# Patient Record
Sex: MALE | Race: BLACK OR AFRICAN AMERICAN | NOT HISPANIC OR LATINO | Employment: FULL TIME | URBAN - METROPOLITAN AREA
[De-identification: names, ages, dates, MRNs, and addresses within clinical notes are randomized per-mention and may not be internally consistent; named-entity substitution may affect disease eponyms.]

---

## 2021-03-07 ENCOUNTER — APPOINTMENT (OUTPATIENT)
Dept: ULTRASOUND IMAGING | Facility: CLINIC | Age: 21
End: 2021-03-07
Attending: EMERGENCY MEDICINE
Payer: OTHER GOVERNMENT

## 2021-03-07 ENCOUNTER — HOSPITAL ENCOUNTER (EMERGENCY)
Facility: CLINIC | Age: 21
Discharge: HOME | End: 2021-03-07
Attending: EMERGENCY MEDICINE | Admitting: EMERGENCY MEDICINE
Payer: OTHER GOVERNMENT

## 2021-03-07 VITALS
BODY MASS INDEX: 20.46 KG/M2 | OXYGEN SATURATION: 99 % | HEART RATE: 77 BPM | SYSTOLIC BLOOD PRESSURE: 128 MMHG | RESPIRATION RATE: 16 BRPM | HEIGHT: 68 IN | DIASTOLIC BLOOD PRESSURE: 88 MMHG | WEIGHT: 135 LBS | TEMPERATURE: 99.7 F

## 2021-03-07 DIAGNOSIS — N45.1 EPIDIDYMITIS: ICD-10-CM

## 2021-03-07 DIAGNOSIS — U07.1 2019 NOVEL CORONAVIRUS DISEASE (COVID-19): ICD-10-CM

## 2021-03-07 DIAGNOSIS — R31.9 URINARY TRACT INFECTION WITH HEMATURIA, SITE UNSPECIFIED: ICD-10-CM

## 2021-03-07 DIAGNOSIS — N50.89 TESTICULAR MICROLITHIASIS: ICD-10-CM

## 2021-03-07 DIAGNOSIS — N39.0 URINARY TRACT INFECTION WITH HEMATURIA, SITE UNSPECIFIED: ICD-10-CM

## 2021-03-07 LAB
ALBUMIN UR-MCNC: 10 MG/DL
ANION GAP SERPL CALCULATED.3IONS-SCNC: 6 MMOL/L (ref 3–14)
APPEARANCE UR: ABNORMAL
BASOPHILS # BLD AUTO: 0 10E9/L (ref 0–0.2)
BASOPHILS NFR BLD AUTO: 0 %
BILIRUB UR QL STRIP: NEGATIVE
BUN SERPL-MCNC: 7 MG/DL (ref 7–30)
CALCIUM SERPL-MCNC: 9.1 MG/DL (ref 8.5–10.1)
CHLORIDE SERPL-SCNC: 102 MMOL/L (ref 94–109)
CO2 SERPL-SCNC: 27 MMOL/L (ref 20–32)
COLOR UR AUTO: ABNORMAL
CREAT SERPL-MCNC: 0.88 MG/DL (ref 0.66–1.25)
DIFFERENTIAL METHOD BLD: ABNORMAL
EOSINOPHIL # BLD AUTO: 0 10E9/L (ref 0–0.7)
EOSINOPHIL NFR BLD AUTO: 0 %
ERYTHROCYTE [DISTWIDTH] IN BLOOD BY AUTOMATED COUNT: 11 % (ref 10–15)
FLUAV RNA RESP QL NAA+PROBE: NEGATIVE
FLUBV RNA RESP QL NAA+PROBE: NEGATIVE
GFR SERPL CREATININE-BSD FRML MDRD: >90 ML/MIN/{1.73_M2}
GLUCOSE SERPL-MCNC: 112 MG/DL (ref 70–99)
GLUCOSE UR STRIP-MCNC: NEGATIVE MG/DL
HCT VFR BLD AUTO: 42.7 % (ref 40–53)
HGB BLD-MCNC: 14.2 G/DL (ref 13.3–17.7)
HGB UR QL STRIP: ABNORMAL
KETONES UR STRIP-MCNC: NEGATIVE MG/DL
LABORATORY COMMENT REPORT: ABNORMAL
LACTATE BLD-SCNC: 1.1 MMOL/L (ref 0.7–2)
LEUKOCYTE ESTERASE UR QL STRIP: ABNORMAL
LYMPHOCYTES # BLD AUTO: 3.1 10E9/L (ref 0.8–5.3)
LYMPHOCYTES NFR BLD AUTO: 13 %
MCH RBC QN AUTO: 31.2 PG (ref 26.5–33)
MCHC RBC AUTO-ENTMCNC: 33.3 G/DL (ref 31.5–36.5)
MCV RBC AUTO: 94 FL (ref 78–100)
MONOCYTES # BLD AUTO: 1.9 10E9/L (ref 0–1.3)
MONOCYTES NFR BLD AUTO: 8 %
MUCOUS THREADS #/AREA URNS LPF: PRESENT /LPF
NEUTROPHILS # BLD AUTO: 18.9 10E9/L (ref 1.6–8.3)
NEUTROPHILS NFR BLD AUTO: 79 %
NITRATE UR QL: NEGATIVE
PH UR STRIP: 7.5 PH (ref 5–7)
PLATELET # BLD AUTO: 183 10E9/L (ref 150–450)
PLATELET # BLD EST: ABNORMAL 10*3/UL
POTASSIUM SERPL-SCNC: 3.2 MMOL/L (ref 3.4–5.3)
RBC # BLD AUTO: 4.55 10E12/L (ref 4.4–5.9)
RBC #/AREA URNS AUTO: 29 /HPF (ref 0–2)
RBC MORPH BLD: ABNORMAL
RSV RNA SPEC QL NAA+PROBE: ABNORMAL
SARS-COV-2 RNA RESP QL NAA+PROBE: POSITIVE
SODIUM SERPL-SCNC: 135 MMOL/L (ref 133–144)
SOURCE: ABNORMAL
SP GR UR STRIP: 1.02 (ref 1–1.03)
SPECIMEN SOURCE: ABNORMAL
SQUAMOUS #/AREA URNS AUTO: <1 /HPF (ref 0–1)
UROBILINOGEN UR STRIP-MCNC: NORMAL MG/DL (ref 0–2)
WBC # BLD AUTO: 23.9 10E9/L (ref 4–11)
WBC #/AREA URNS AUTO: >182 /HPF (ref 0–5)

## 2021-03-07 PROCEDURE — 96374 THER/PROPH/DIAG INJ IV PUSH: CPT

## 2021-03-07 PROCEDURE — 80048 BASIC METABOLIC PNL TOTAL CA: CPT | Performed by: EMERGENCY MEDICINE

## 2021-03-07 PROCEDURE — 83605 ASSAY OF LACTIC ACID: CPT | Performed by: EMERGENCY MEDICINE

## 2021-03-07 PROCEDURE — 96372 THER/PROPH/DIAG INJ SC/IM: CPT | Performed by: EMERGENCY MEDICINE

## 2021-03-07 PROCEDURE — 76870 US EXAM SCROTUM: CPT

## 2021-03-07 PROCEDURE — 85025 COMPLETE CBC W/AUTO DIFF WBC: CPT | Performed by: EMERGENCY MEDICINE

## 2021-03-07 PROCEDURE — 87491 CHLMYD TRACH DNA AMP PROBE: CPT | Performed by: EMERGENCY MEDICINE

## 2021-03-07 PROCEDURE — 87086 URINE CULTURE/COLONY COUNT: CPT | Performed by: EMERGENCY MEDICINE

## 2021-03-07 PROCEDURE — 87591 N.GONORRHOEAE DNA AMP PROB: CPT | Performed by: EMERGENCY MEDICINE

## 2021-03-07 PROCEDURE — 99284 EMERGENCY DEPT VISIT MOD MDM: CPT | Mod: 25

## 2021-03-07 PROCEDURE — 250N000013 HC RX MED GY IP 250 OP 250 PS 637: Performed by: EMERGENCY MEDICINE

## 2021-03-07 PROCEDURE — C9803 HOPD COVID-19 SPEC COLLECT: HCPCS

## 2021-03-07 PROCEDURE — 250N000011 HC RX IP 250 OP 636: Performed by: EMERGENCY MEDICINE

## 2021-03-07 PROCEDURE — 81001 URINALYSIS AUTO W/SCOPE: CPT | Performed by: EMERGENCY MEDICINE

## 2021-03-07 PROCEDURE — 250N000009 HC RX 250: Performed by: EMERGENCY MEDICINE

## 2021-03-07 PROCEDURE — 87636 SARSCOV2 & INF A&B AMP PRB: CPT | Performed by: EMERGENCY MEDICINE

## 2021-03-07 PROCEDURE — 87040 BLOOD CULTURE FOR BACTERIA: CPT | Performed by: EMERGENCY MEDICINE

## 2021-03-07 RX ORDER — POTASSIUM CHLORIDE 1500 MG/1
40 TABLET, EXTENDED RELEASE ORAL ONCE
Status: COMPLETED | OUTPATIENT
Start: 2021-03-07 | End: 2021-03-07

## 2021-03-07 RX ORDER — ACETAMINOPHEN 325 MG/1
650 TABLET ORAL ONCE
Status: COMPLETED | OUTPATIENT
Start: 2021-03-07 | End: 2021-03-07

## 2021-03-07 RX ORDER — IBUPROFEN 600 MG/1
600 TABLET, FILM COATED ORAL EVERY 6 HOURS PRN
Qty: 20 TABLET | Refills: 0 | Status: SHIPPED | OUTPATIENT
Start: 2021-03-07

## 2021-03-07 RX ORDER — CEPHALEXIN 500 MG/1
500 CAPSULE ORAL 2 TIMES DAILY
Qty: 14 CAPSULE | Refills: 0 | Status: SHIPPED | OUTPATIENT
Start: 2021-03-07 | End: 2021-03-14

## 2021-03-07 RX ORDER — ONDANSETRON 4 MG/1
4 TABLET, ORALLY DISINTEGRATING ORAL EVERY 8 HOURS PRN
Qty: 10 TABLET | Refills: 0 | Status: SHIPPED | OUTPATIENT
Start: 2021-03-07 | End: 2021-03-10

## 2021-03-07 RX ORDER — HYDROCODONE BITARTRATE AND ACETAMINOPHEN 5; 325 MG/1; MG/1
1 TABLET ORAL EVERY 6 HOURS PRN
Qty: 15 TABLET | Refills: 0 | Status: SHIPPED | OUTPATIENT
Start: 2021-03-07

## 2021-03-07 RX ORDER — DOXYCYCLINE 100 MG/1
100 CAPSULE ORAL ONCE
Status: COMPLETED | OUTPATIENT
Start: 2021-03-07 | End: 2021-03-07

## 2021-03-07 RX ORDER — KETOROLAC TROMETHAMINE 15 MG/ML
15 INJECTION, SOLUTION INTRAMUSCULAR; INTRAVENOUS ONCE
Status: COMPLETED | OUTPATIENT
Start: 2021-03-07 | End: 2021-03-07

## 2021-03-07 RX ORDER — DOXYCYCLINE 100 MG/1
100 CAPSULE ORAL 2 TIMES DAILY
Qty: 20 CAPSULE | Refills: 0 | Status: SHIPPED | OUTPATIENT
Start: 2021-03-07 | End: 2021-03-17

## 2021-03-07 RX ADMIN — DOXYCYCLINE HYCLATE 100 MG: 100 CAPSULE ORAL at 22:01

## 2021-03-07 RX ADMIN — LIDOCAINE HYDROCHLORIDE 500 MG: 10 INJECTION, SOLUTION EPIDURAL; INFILTRATION; INTRACAUDAL; PERINEURAL at 21:59

## 2021-03-07 RX ADMIN — KETOROLAC TROMETHAMINE 15 MG: 15 INJECTION, SOLUTION INTRAMUSCULAR; INTRAVENOUS at 20:28

## 2021-03-07 RX ADMIN — ACETAMINOPHEN 650 MG: 325 TABLET, FILM COATED ORAL at 20:28

## 2021-03-07 RX ADMIN — POTASSIUM CHLORIDE 40 MEQ: 1500 TABLET, EXTENDED RELEASE ORAL at 22:01

## 2021-03-07 ASSESSMENT — ENCOUNTER SYMPTOMS
NAUSEA: 0
RHINORRHEA: 0
FLANK PAIN: 0
COUGH: 0
DYSURIA: 1
BACK PAIN: 0
FEVER: 1
ABDOMINAL PAIN: 0
VOMITING: 0

## 2021-03-07 ASSESSMENT — MIFFLIN-ST. JEOR: SCORE: 1586.86

## 2021-03-07 NOTE — LETTER
March 7, 2021      To Whom It May Concern:      Christopher Sharma was seen in our Emergency Department today, 03/07/21.  I expect his condition to improve over the next 2 days.  He may return to work/school when improved.    Sincerely,        Angela Wallace RN

## 2021-03-08 ENCOUNTER — PATIENT OUTREACH (OUTPATIENT)
Dept: CARE COORDINATION | Facility: CLINIC | Age: 21
End: 2021-03-08

## 2021-03-08 ENCOUNTER — TELEPHONE (OUTPATIENT)
Dept: EMERGENCY MEDICINE | Facility: CLINIC | Age: 21
End: 2021-03-08

## 2021-03-08 DIAGNOSIS — U07.1 2019 NOVEL CORONAVIRUS DISEASE (COVID-19): Primary | ICD-10-CM

## 2021-03-08 LAB
BACTERIA SPEC CULT: NO GROWTH
C TRACH DNA SPEC QL NAA+PROBE: NEGATIVE
Lab: NORMAL
N GONORRHOEA DNA SPEC QL NAA+PROBE: POSITIVE
SPECIMEN SOURCE: ABNORMAL
SPECIMEN SOURCE: NORMAL
SPECIMEN SOURCE: NORMAL

## 2021-03-08 NOTE — RESULT ENCOUNTER NOTE
Emergency Dept/Urgent Care discharge antibiotic (if prescribed): [1] Cephalexin (Keflex) 500 mg capsule, 1 capsule (500 mg) by mouth 2 times daily for 7 days AND [2] Doxycycline 100 mg PO tablet, 1 tablet (100 mg) by mouth 2 times daily for 10 days  Date of Rx (if applicable):  3/7/21  No changes in treatment per Urine culture protocol.

## 2021-03-08 NOTE — ED PROVIDER NOTES
"  History   Chief Complaint:  Testicular/scrotal Pain      The history is provided by the patient.      Christopher Sharma is a 20 year old male who presents to the ED for evaluation of testicular/scrotal pain amongst other complaints. The patient reports working out and lifting pretty heavily yesterday when he began to feel pain in his right testicle. Since then the pain has been intermittent.  Also complains of green-colored penile discharge  And mild dysuria. Pain is exacerbated when laying in certain positions. Patient thinks it may be a hernia, but states he does not have a history of them. States he is developing a subjective fever along with loss of taste today as well. Denies cough, difficulty breathing, abdominal pain, back pain or runny nose. Patient is currently sexually active and expresses concerns for STIs.  No known COVID 19 exposures.    Review of Systems   Constitutional: Positive for fever (subjective).   HENT: Negative for rhinorrhea.    Respiratory: Negative for cough.    Gastrointestinal: Negative for abdominal pain, nausea and vomiting.   Genitourinary: Positive for discharge, dysuria, penile pain and testicular pain. Negative for flank pain.   Musculoskeletal: Negative for back pain.   All other systems reviewed and are negative.    Allergies:  No Known Allergies    Medications:    The patient is not currently taking any prescribed medications.    Past Medical History:    The patient denies past medical history.     Past Surgical History:    The patient denies past surgical history.      Family History:    The patient denies past family history.     Social History:  Presents to the ED with self  Currently sexually active    Physical Exam     Patient Vitals for the past 24 hrs:   BP Temp Temp src Pulse Resp SpO2 Height Weight   03/07/21 2150 -- -- -- 80 18 99 % -- --   03/07/21 2006 (!) 137/92 99.7  F (37.6  C) Oral 71 18 99 % 1.727 m (5' 8\") 61.2 kg (135 lb)       Physical Exam  Nursing note and " vitals reviewed.  Constitutional: Well nourished.   Eyes: Conjunctiva normal.  Pupils are equal, round, and reactive to light.   ENT: Nose normal. Mucous membranes pink and moist.    Neck: Normal range of motion.  CVS: Normal rate, regular rhythm.  Normal heart sounds.  No murmur.  Pulmonary: Lungs clear to auscultation bilaterally. No wheezes/rales/rhonchi.  GI: Abdomen soft. Nontender, nondistended. No rigidity or guarding.  No CVA tenderness  : Circumcised.  R. Testicular tenderness, mild overlying erythema, no significant warmth/crepitance/induration. L. Testicle without tenderness/erythema. Normal penis, no penile discharge.  Chaperone BENTLEY Miller  MSK: No calf tenderness or swelling.  Neuro: Alert. Follows simple commands.  Skin: Skin is warm and dry. No rash noted.   Psychiatric: Normal affect.       Emergency Department Course   Imaging:  Radiology findings were communicated with the patient who voiced understanding of the findings.    US Testicular & Scrotum w Doppler Limited:  1.  Right epididymitis.   2.  Testicular microlithiasis.    Reading per radiology    Laboratory:  Laboratory findings were communicated with the patient who voiced understanding of the findings.    CBC: WBC: 23.9 (H), HGB: 14.2, PLT: 183    BMP: Glucose 112 (H), Potassium: 3.2 (L), o/w WNL (Creatinine: 0.88)    UA: Blood: Small, pH: 7.5 (H), Protein Albumin: 10, Leukocyte Esterase: Large, WBC: >182 (H), RBC: 29 (H), Mucous: Present, o/w Negative    Lactic acid (Resulted 2046): 1.1    Neisseria Gonorrheae PCR: Pending    Chlamydia Trachomatis PCR: Pending    Symptomatic Influenza A/B antigen & COVID-19 PCR: Positive for Covid     Urine Culture: Pending    Blood Cultures x2: Pending    Emergency Department Course:    Reviewed:  I reviewed the patient's nursing notes, vitals.     Assessments:  2012 I first assessed the patient and performed an exam. Discussed plans for care.    2015 Performed testicular exam with a female chaperone  present.     2130 I rechecked the patient and updated them on their results. Discussed plans for discharge.    Interventions:  2028: Toradol 15 mg IV  2028: Tylenol 650 mg PO   Rocephin 500 mg IM   Doxycycline 100 mg PO   Klor-Con 40 mEq PO    Disposition:  The patient was discharged to home.     Impression & Plan      Covid-19  Christopher Sharma was evaluated during a global COVID-19 pandemic, which necessitated consideration that the patient might be at risk for infection with the SARS-CoV-2 virus that causes COVID-19.   Applicable protocols for evaluation were followed during the patient's care.   COVID-19 was considered as part of the patient's evaluation. The plan for testing is:  a test was obtained during this visit.    Medical Decision Making:   Christopher Sharma is a 20 year old male who presents with myriad of complaints predominately testicular complaint/penile discharge, dysuria and change in taste with subjective fevers.  Patient nontoxic appearing on arrival.  Patient tested positive for COVID 19 today.  He was made aware of need for self quarantine and counseled on the course of COVID-19.  He has clear lungs, no significant respiratory distress to necessitate chest x-ray.  He does have a noted leukocytosis though suspect suspect this is in part secondary to epididymitis seen on ultrasound as well as concern for UTI.  He was tested for gonorrhea and chlamydia and empirically given IM Rocephin and doxycycline.  Patient feels comfortable with discharge home at this point in time.  No evidence of severe sepsis or shock but given profound leukocytosis blood cultures have been drawn.  He has no significant abdominal tenderness and I do not feel imaging is warranted today as I doubt intra-abdominal catastrophe.  Plans for close outpatient PCP follow-up and patient will be provided urology follow-up as well.  Incidental testicular microlithiasis sees seen on imaging which were discussed with the patient.  I did  recommend continuing antibiotics on dispo and he will be provided analgesia as well.  We will initiate Keflex for management of UTI as well as doxycycline given epididymitis.  Patient was counseled to avoid sexual activity at this time pending his gonorrhea and Chlamydia results.  He is instructed to return to the ED for worsening pain, difficulty breathing or should symptoms worsen or change.      Diagnosis:     ICD-10-CM    1. Urinary tract infection with hematuria, site unspecified  N39.0 Symptomatic Influenza A/B & SARS-CoV2 (COVID-19) Virus PCR Multiplex    R31.9 Urine Culture Aerobic Bacterial     Blood culture   2. Epididymitis  N45.1    3. Testicular microlithiasis  N50.89    4. 2019 novel coronavirus disease (COVID-19)  U07.1        Discharge Medications:  New Prescriptions    CEPHALEXIN (KEFLEX) 500 MG CAPSULE    Take 1 capsule (500 mg) by mouth 2 times daily for 7 days    DOXYCYCLINE HYCLATE (VIBRAMYCIN) 100 MG CAPSULE    Take 1 capsule (100 mg) by mouth 2 times daily for 10 days    HYDROCODONE-ACETAMINOPHEN (NORCO) 5-325 MG TABLET    Take 1 tablet by mouth every 6 hours as needed for pain    IBUPROFEN (ADVIL/MOTRIN) 600 MG TABLET    Take 1 tablet (600 mg) by mouth every 6 hours as needed for moderate pain    ONDANSETRON (ZOFRAN ODT) 4 MG ODT TAB    Take 1 tablet (4 mg) by mouth every 8 hours as needed for nausea        Scribe Disclosure:  EFRAIN, Eloise Mauro, am serving as a scribe on 3/7/2021 at 8:12 PM to personally document services performed by Sonya Monzon DO based on my observations and the provider's statements to me.           Sonya Monzon DO  03/07/21 [id number]

## 2021-03-08 NOTE — ED TRIAGE NOTES
Here for concern of right testicular pain and swelling after working out yesterday afternoon. Denies any injuries. Stated chills and lose of taste today. Also stated that he thinks there are some yellowish discharge from his penis, wants STD testing. ABCs intact.

## 2021-03-08 NOTE — PROGRESS NOTES
Clinic Care Coordination Contact  Community Health Worker Initial Outreach    Patient picked up call and and hung up on [facility] CHW. No further outreach will be conducted at this time.

## 2021-03-08 NOTE — DISCHARGE INSTRUCTIONS
Discharge Instructions  COVID-19    COVID-19 is the disease caused by a new coronavirus. The virus spreads from person-to-person primarily by droplets when an infected person coughs or sneezes and the droplet either lands on another person or that other person touches a surface with the droplet on it. There are tests available to diagnose COVID-19. There is no specific treatment or medicine for the disease.    You may have been diagnosed with COVID, may be being tested for COVID and have a pending test result, or may have been exposed to COVID.    Symptoms of COVID-19    Many people have no symptoms or mild symptoms.  Symptoms may usually appear 4 to 5 days (up to 14 days) after contact with a person with COVID-19. Some people will get severe symptoms and pneumonia. Usual symptoms are:     ? Fever  ? Cough  ? Trouble breathing    Less common symptoms are: Headache, body aches, sore throat, sneezing, diarrhea, loss of taste or smell.    Isolation and Quarantine    You were seen because you have symptoms, had an exposure, or had some other concern about possible COVID. The best way to stop the spread of the virus is to avoid contact with others.  Isolation refers to sick people staying away from people who are not sick. A person in quarantine is limiting activity because they were exposed and are waiting to see if they might become sick.    If you test positive for COVID, you should stay home (isolation) for at least 10 days after your symptoms began, and for 24 hours with no fever and improvement of symptoms--whichever is longer. (Your fever should be gone for 24 hours without using fever-reducing medicine). If you have no symptoms, you should stay home (isolation) for 10 days from the day of the test.    For example, if you have a fever and cough for 6 days, you need to stay home 4 more days with no fever for a total of 10 days. Or, if you have a fever and cough for 10 days, you need to stay home one more day with  no fever for a total of 11 days.    If you have a high-risk exposure to COVID (you spent 15 minutes or more within six feet of somebody who has COVID), you should stay home (quarantine) for 14 days. Even if you test negative for COVID, the CDC recommends a 14-day quarantine from the time of your last exposure to that individual. There are options for a shortened (<14 day quarantine) you can review at:    [email or web address]/coronavirus/close.html#long    If you have symptoms but a negative test, you should stay at home until you are symptom-free and without fever for 24 hours, using the same judgment you would for when it is safe to return to work/school from strep throat, influenza, or the common cold. If you worsen, you should consider being re-evaluated.    If you are being tested for COVID and your test is pending, you should stay home until you know your test result.    How should I protect myself and others?    Do not go to work or school. Have a friend or relative do your shopping. Do not use public transportation (bus, train) or ridesharing (Lyft, Uber).    Separate yourself from other people in your home. As much as possible, you should stay in one room and away from other people in your home. Also, use a separate bathroom, if possible. Avoid handling pets or other animals while sick.     Wear a facemask if you need to be around other people and cover your mouth and nose with a tissue when you cough or sneeze.     Avoid sharing personal household items. You should not share dishes, drinking glasses, forks/knives/spoons, towels, or bedding with other people in your home. After using these items, they should be washed with soap and water. Clean parts of your home that are touched often (doorknobs, faucets, countertops, etc.) daily.     Wash your hands often with soap and water for at least 20 seconds or use an alcohol-based hand  containing at least 60% alcohol.     Avoid touching  your face.    Treat your symptoms. You can take Acetaminophen (Tylenol) to treat body aches and fever as needed for comfort. Ibuprofen (Advil or Motrin) can be used as well if you still have symptoms after taking Tylenol. Drink fluids. Rest.    Watch for worsening symptoms such as shortness of breath/difficulty breathing or very severe weakness.    Employers/workplaces are being asked by the Centers for Disease Control (CDC) to not request notes/documentation for you to return to work or prove that you were ill. You may choose to show your employer this paperwork. Also, repeat testing should not be required to return to work.    Return to the Emergency Department if:    If you are developing worsening breathing, shortness of breath, or feel worse you should seek medical attention.  If you are uncertain, contact your health care provider/clinic. If you need emergency medical attention, call 911 and tell them you have been ill.

## 2021-03-08 NOTE — TELEPHONE ENCOUNTER
[facility]:    [place] ED lab result protocol used  N. Gonorrhea protocol    Reason for call  Notify of lab results, assess symptoms,  review ED providers recommendations/discharge instructions (if necessary) and advise per ED lab result f/u protocol    Lab Result   Final N. Gonorrhoeae PCR is POSITIVE.   Patient was treated appropriately in the ED [Yes or No]:  Yes       If Yes, list what was given in the ED:  Ceftriaxone (Rocephin) 500 mg IM x 1 AND Doxycycline 100 mg PO BID for 10 days  [facility] ED discharge antibiotic (if prescribed): Doxycyline and Cephalexin (Keflex) 500 mg capsule, 1 capsule (500 mg) by mouth 2 times daily for 7 days.  If treated appropriately in the [place] ED, notify patient of result and STD instructions.  Information table from ED Provider visit on 3/7/21  Symptoms reported at ED visit (Chief complaint, HPI) Testicular/scrotal Pain        The history is provided by the patient.      Christopher Sharma is a 20 year old male who presents to the ED for evaluation of testicular/scrotal pain amongst other complaints. The patient reports working out and lifting pretty heavily yesterday when he began to feel pain in his right testicle. Since then the pain has been intermittent.  Also complains of green-colored penile discharge  And mild dysuria. Pain is exacerbated when laying in certain positions. Patient thinks it may be a hernia, but states he does not have a history of them. States he is developing a subjective fever along with loss of taste today as well. Denies cough, difficulty breathing, abdominal pain, back pain or runny nose. Patient is currently sexually active and expresses concerns for STIs.  No known COVID 19 exposures.   ED providers Impression and Plan (applicable information) Covid-19  Christopher Sharma was evaluated during a global COVID-19 pandemic, which necessitated consideration that the patient  might be at risk for infection with the SARS-CoV-2 virus that causes COVID-19.   Applicable protocols for evaluation were followed during the patient's care.   COVID-19 was considered as part of the patient's evaluation. The plan for testing is:  a test was obtained during this visit.     Medical Decision Making:   Christopher Sharma is a 20 year old male who presents with myriad of complaints predominately testicular complaint/penile discharge, dysuria and change in taste with subjective fevers.  Patient nontoxic appearing on arrival.  Patient tested positive for COVID 19 today.  He was made aware of need for self quarantine and counseled on the course of COVID-19.  He has clear lungs, no significant respiratory distress to necessitate chest x-ray.  He does have a noted leukocytosis though suspect suspect this is in part secondary to epididymitis seen on ultrasound as well as concern for UTI.  He was tested for gonorrhea and chlamydia and empirically given IM Rocephin and doxycycline.  Patient feels comfortable with discharge home at this point in time.  No evidence of severe sepsis or shock but given profound leukocytosis blood cultures have been drawn.  He has no significant abdominal tenderness and I do not feel imaging is warranted today as I doubt intra-abdominal catastrophe.  Plans for close outpatient PCP follow-up and patient will be provided urology follow-up as well.  Incidental testicular microlithiasis sees seen on imaging which were discussed with the patient.  I did recommend continuing antibiotics on dispo and he will be provided analgesia as well.  We will initiate Keflex for management of UTI as well as doxycycline given epididymitis.  Patient was counseled to avoid sexual activity at this time pending his gonorrhea and Chlamydia results.  He is instructed to return to the ED for worsening pain, difficulty breathing or should symptoms worsen or change.   Miscellaneous information na     RN Assessment  (Patient s current Symptoms), include time called.  [Insert Left message here if message left]  1:44PM: Left voicemail message requesting a call back to [facility] ED Lab Result RN at [phone].  RN is available every day between 10 a.m. and 6:30 p.m.  See Telephone encounter.    [RN Name]  Alessia Barnhart RN  [facility] RN  Lung Nodule and ED Lab Result RN  Epic pool (ED late result f/u RN): [id number]  [facility] INCIDENTAL RADIOLOGY F/U NURSES: P [id number]  # [phone]      Copy of Lab result

## 2021-03-09 NOTE — RESULT ENCOUNTER NOTE
Final urine culture report is NEGATIVE per [place] ED Lab Result protocol.    If NEGATIVE result, no change in treatment, per [facility] ED Lab Result protocol.

## 2021-03-09 NOTE — TELEPHONE ENCOUNTER
[facility] result notification     Patient/parent Name  Zoealexey    BENTLEY Assessment (Patient s current Symptoms), include time called.  [Insert Left message here if message left]  1:26 Spoke with patient, discussed results and his treatment. He will continue the oral medications as prescribed as well.  RN Recommendations/Instructions per [place] ED lab result protocol  Patient notified of lab result and treatment recommendations. STD Patient Instructions:    We recommend that you contact any recent sexual partners within the last 2 months and have them evaluated by a physician.    Avoid sexual activity for 7 to 10 days or until both your and your partner(s) have completed all antibiotic medications.    We advise that you consider following up with your PCP at approximately 3 months for retesting to be sure the infection has cleared.     Peyton Ramos  [email or web address] [facility]  Emergency Dept Lab Result RN  # [phone]

## 2021-03-13 LAB
BACTERIA SPEC CULT: NO GROWTH
SPECIMEN SOURCE: NORMAL

## 2021-03-14 LAB
BACTERIA SPEC CULT: NO GROWTH
SPECIMEN SOURCE: NORMAL

## 2024-10-10 ENCOUNTER — OFFICE VISIT (OUTPATIENT)
Dept: URGENT CARE | Facility: URGENT CARE | Age: 24
End: 2024-10-10

## 2024-10-10 VITALS
HEART RATE: 61 BPM | TEMPERATURE: 98.4 F | SYSTOLIC BLOOD PRESSURE: 146 MMHG | WEIGHT: 138.9 LBS | BODY MASS INDEX: 21.12 KG/M2 | RESPIRATION RATE: 16 BRPM | OXYGEN SATURATION: 100 % | DIASTOLIC BLOOD PRESSURE: 89 MMHG

## 2024-10-10 DIAGNOSIS — R51.9 NONINTRACTABLE HEADACHE, UNSPECIFIED CHRONICITY PATTERN, UNSPECIFIED HEADACHE TYPE: Primary | ICD-10-CM

## 2024-10-10 PROCEDURE — 99203 OFFICE O/P NEW LOW 30 MIN: CPT | Performed by: PHYSICIAN ASSISTANT

## 2024-10-10 ASSESSMENT — ENCOUNTER SYMPTOMS
RESPIRATORY NEGATIVE: 1
COUGH: 0
NAUSEA: 0
VOMITING: 0
HEMATURIA: 0
ABDOMINAL PAIN: 0
FREQUENCY: 0
MYALGIAS: 0
WHEEZING: 0
CHEST TIGHTNESS: 0
GASTROINTESTINAL NEGATIVE: 1
ALLERGIC/IMMUNOLOGIC NEGATIVE: 1
PALPITATIONS: 0
FEVER: 0
MUSCULOSKELETAL NEGATIVE: 1
CONSTITUTIONAL NEGATIVE: 1
CHILLS: 0
CARDIOVASCULAR NEGATIVE: 1
SHORTNESS OF BREATH: 0
SORE THROAT: 0
DIARRHEA: 0
DYSURIA: 0
HEADACHES: 1

## 2024-10-10 NOTE — PROGRESS NOTES
"Chief Complaint:    Chief Complaint   Patient presents with    Headache     Headache for the past 2 weeks ongoing, has been feeling hot and throws up sometimes when having the headache, has been taking OTC meds but it still keeps coming back, feels lights make it worse      Medical Decision Making:    Vital signs reviewed by Moises Chavis PA-C  BP (!) 146/89 (BP Location: Left arm, Patient Position: Sitting, Cuff Size: Adult Regular)   Pulse 61   Temp 98.4  F (36.9  C) (Tympanic)   Resp 16   Wt 63 kg (138 lb 14.4 oz)   SpO2 100%   BMI 21.12 kg/m         ASSESSMENT:     1. Nonintractable headache, unspecified chronicity pattern, unspecified headache type       PLAN:    Patient is currently asymptomatic.  Patient instructed to follow up with PCP in the next 2 weeks for further evaluation.    Worrisome symptoms discussed with instructions to go to the ED.  Patient verbalized understanding and agreed with this plan.    Labs:     No results found for any visits on 10/10/24.    Current Meds:    Current Outpatient Medications:     ibuprofen (ADVIL/MOTRIN) 600 MG tablet, Take 1 tablet (600 mg) by mouth every 6 hours as needed for moderate pain, Disp: 20 tablet, Rfl: 0    HYDROcodone-acetaminophen (NORCO) 5-325 MG tablet, Take 1 tablet by mouth every 6 hours as needed for pain (Patient not taking: Reported on 10/10/2024), Disp: 15 tablet, Rfl: 0    Allergies:  No Known Allergies    SUBJECTIVE    HPI: Christopher Sharma is an 23 year old male who presents for evaluation and treatment of headache.  Headache started 2 weeks ago and comes and goes.  Headache is behind the R eye.  He does have some photophobia.  He takes \"headache medicine\",  and this helps for a bit.  He denies worse headache of his life.  No dizziness, nausea or vomiting.  He does not have a headache right now.      Patient is new to [facility].      ROS:      Review of Systems   Constitutional: Negative.  Negative for chills and fever.   HENT: " Negative.  Negative for sore throat.    Respiratory: Negative.  Negative for cough, chest tightness, shortness of breath and wheezing.    Cardiovascular: Negative.  Negative for chest pain and palpitations.   Gastrointestinal: Negative.  Negative for abdominal pain, diarrhea, nausea and vomiting.   Genitourinary:  Negative for dysuria, frequency, hematuria and urgency.   Musculoskeletal: Negative.  Negative for myalgias.   Skin:  Negative for rash.   Allergic/Immunologic: Negative.  Negative for immunocompromised state.   Neurological:  Positive for headaches.        Family History   No family history on file.    Social History  Social History     Socioeconomic History    Marital status: Single     Spouse name: Not on file    Number of children: Not on file    Years of education: Not on file    Highest education level: Not on file   Occupational History    Not on file   Tobacco Use    Smoking status: Every Day     Types: Cigarettes, Other    Smokeless tobacco: Never   Substance and Sexual Activity    Alcohol use: Not on file    Drug use: Not on file    Sexual activity: Not on file   Other Topics Concern    Not on file   Social History Narrative    Not on file     Social Determinants of Health     Financial Resource Strain: Not on file   Food Insecurity: Not on file   Transportation Needs: Not on file   Physical Activity: Not on file   Stress: Not on file   Social Connections: Not on file   Interpersonal Safety: Not on file   Housing Stability: Not on file        Surgical History:  No past surgical history on file.     Problem List:  There is no problem list on file for this patient.       OBJECTIVE:     Vital signs noted and reviewed by Moises Chavis PA-C  BP (!) 146/89 (BP Location: Left arm, Patient Position: Sitting, Cuff Size: Adult Regular)   Pulse 61   Temp 98.4  F (36.9  C) (Tympanic)   Resp 16   Wt 63 kg (138 lb 14.4 oz)   SpO2 100%   BMI 21.12 kg/m       PEFR:    Physical Exam  Vitals and nursing  note reviewed.   Constitutional:       General: He is not in acute distress.     Appearance: He is well-developed. He is not ill-appearing, toxic-appearing or diaphoretic.   HENT:      Head: Normocephalic and atraumatic.      Right Ear: Hearing, tympanic membrane, ear canal and external ear normal. Tympanic membrane is not perforated, erythematous, retracted or bulging.      Left Ear: Hearing, tympanic membrane, ear canal and external ear normal. Tympanic membrane is not perforated, erythematous, retracted or bulging.      Nose: Nose normal. No mucosal edema, congestion or rhinorrhea.      Mouth/Throat:      Pharynx: No oropharyngeal exudate or posterior oropharyngeal erythema.      Tonsils: No tonsillar exudate or tonsillar abscesses. 0 on the right. 0 on the left.   Eyes:      Pupils: Pupils are equal, round, and reactive to light.   Cardiovascular:      Rate and Rhythm: Normal rate and regular rhythm.      Heart sounds: Normal heart sounds, S1 normal and S2 normal. Heart sounds not distant. No murmur heard.     No friction rub. No gallop.   Pulmonary:      Effort: Pulmonary effort is normal. No respiratory distress.      Breath sounds: Normal breath sounds. No decreased breath sounds, wheezing, rhonchi or rales.   Abdominal:      General: Bowel sounds are normal. There is no distension.      Palpations: Abdomen is soft.      Tenderness: There is no abdominal tenderness.   Musculoskeletal:      Cervical back: Normal range of motion and neck supple.   Lymphadenopathy:      Cervical: No cervical adenopathy.   Skin:     General: Skin is warm and dry.      Findings: No rash.   Neurological:      Mental Status: He is alert.      Cranial Nerves: No cranial nerve deficit.   Psychiatric:         Attention and Perception: He is attentive.         Speech: Speech normal.         Behavior: Behavior normal. Behavior is cooperative.         Thought Content: Thought content normal.         Judgment: Judgment normal.              Moises Chavis PA-C  10/10/2024, 4:31 PM